# Patient Record
Sex: MALE | Race: WHITE | Employment: FULL TIME | URBAN - METROPOLITAN AREA
[De-identification: names, ages, dates, MRNs, and addresses within clinical notes are randomized per-mention and may not be internally consistent; named-entity substitution may affect disease eponyms.]

---

## 2021-11-04 ENCOUNTER — HOSPITAL ENCOUNTER (EMERGENCY)
Age: 26
Discharge: HOME | End: 2021-11-04
Attending: EMERGENCY MEDICINE
Payer: OTHER GOVERNMENT

## 2021-11-04 ENCOUNTER — APPOINTMENT (OUTPATIENT)
Dept: GENERAL RADIOLOGY | Age: 26
End: 2021-11-04
Attending: PHYSICIAN ASSISTANT
Payer: OTHER GOVERNMENT

## 2021-11-04 VITALS
HEART RATE: 56 BPM | SYSTOLIC BLOOD PRESSURE: 131 MMHG | BODY MASS INDEX: 32.28 KG/M2 | HEIGHT: 68 IN | TEMPERATURE: 97.9 F | RESPIRATION RATE: 15 BRPM | OXYGEN SATURATION: 100 % | DIASTOLIC BLOOD PRESSURE: 71 MMHG | WEIGHT: 213 LBS

## 2021-11-04 DIAGNOSIS — R07.9 CHEST PAIN, UNSPECIFIED TYPE: Primary | ICD-10-CM

## 2021-11-04 DIAGNOSIS — R55 NEAR SYNCOPE: ICD-10-CM

## 2021-11-04 DIAGNOSIS — Z82.49 FAMILY HISTORY OF HEART DISEASE: ICD-10-CM

## 2021-11-04 DIAGNOSIS — F41.9 ANXIETY: ICD-10-CM

## 2021-11-04 LAB
ALBUMIN SERPL-MCNC: 4.7 G/DL (ref 3.4–5)
ALBUMIN/GLOB SERPL: 1.5 {RATIO} (ref 0.8–1.7)
ALP SERPL-CCNC: 62 U/L (ref 45–117)
ALT SERPL-CCNC: 38 U/L (ref 16–61)
AMPHET UR QL SCN: NEGATIVE
ANION GAP SERPL CALC-SCNC: 10 MMOL/L (ref 3–18)
APPEARANCE UR: CLEAR
AST SERPL-CCNC: 19 U/L (ref 10–38)
BARBITURATES UR QL SCN: NEGATIVE
BASOPHILS # BLD: 0.1 K/UL (ref 0–0.1)
BASOPHILS NFR BLD: 0 % (ref 0–2)
BENZODIAZ UR QL: NEGATIVE
BILIRUB SERPL-MCNC: 0.8 MG/DL (ref 0.2–1)
BILIRUB UR QL: NEGATIVE
BUN SERPL-MCNC: 12 MG/DL (ref 7–18)
BUN/CREAT SERPL: 11 (ref 12–20)
CALCIUM SERPL-MCNC: 10.5 MG/DL (ref 8.5–10.1)
CANNABINOIDS UR QL SCN: NEGATIVE
CHLORIDE SERPL-SCNC: 103 MMOL/L (ref 100–111)
CK MB CFR SERPL CALC: NORMAL % (ref 0–4)
CK MB SERPL-MCNC: <1 NG/ML (ref 5–25)
CK SERPL-CCNC: 133 U/L (ref 39–308)
CO2 SERPL-SCNC: 25 MMOL/L (ref 21–32)
COCAINE UR QL SCN: NEGATIVE
COLOR UR: YELLOW
CREAT SERPL-MCNC: 1.06 MG/DL (ref 0.6–1.3)
D DIMER PPP FEU-MCNC: <0.27 UG/ML(FEU)
DIFFERENTIAL METHOD BLD: ABNORMAL
EOSINOPHIL # BLD: 0.2 K/UL (ref 0–0.4)
EOSINOPHIL NFR BLD: 1 % (ref 0–5)
ERYTHROCYTE [DISTWIDTH] IN BLOOD BY AUTOMATED COUNT: 13.7 % (ref 11.6–14.5)
GLOBULIN SER CALC-MCNC: 3.1 G/DL (ref 2–4)
GLUCOSE SERPL-MCNC: 100 MG/DL (ref 74–99)
GLUCOSE UR STRIP.AUTO-MCNC: NEGATIVE MG/DL
HCT VFR BLD AUTO: 45.8 % (ref 36–48)
HDSCOM,HDSCOM: NORMAL
HGB BLD-MCNC: 16 G/DL (ref 13–16)
HGB UR QL STRIP: NEGATIVE
KETONES UR QL STRIP.AUTO: ABNORMAL MG/DL
LEUKOCYTE ESTERASE UR QL STRIP.AUTO: NEGATIVE
LIPASE SERPL-CCNC: 126 U/L (ref 73–393)
LYMPHOCYTES # BLD: 4.4 K/UL (ref 0.9–3.6)
LYMPHOCYTES NFR BLD: 33 % (ref 21–52)
MAGNESIUM SERPL-MCNC: 1.8 MG/DL (ref 1.6–2.6)
MCH RBC QN AUTO: 30.7 PG (ref 24–34)
MCHC RBC AUTO-ENTMCNC: 34.9 G/DL (ref 31–37)
MCV RBC AUTO: 87.7 FL (ref 78–100)
METHADONE UR QL: NEGATIVE
MONOCYTES # BLD: 0.7 K/UL (ref 0.05–1.2)
MONOCYTES NFR BLD: 5 % (ref 3–10)
NEUTS SEG # BLD: 8.1 K/UL (ref 1.8–8)
NEUTS SEG NFR BLD: 59 % (ref 40–73)
NITRITE UR QL STRIP.AUTO: NEGATIVE
OPIATES UR QL: NEGATIVE
PCP UR QL: NEGATIVE
PH UR STRIP: 6.5 [PH] (ref 5–8)
PLATELET # BLD AUTO: 389 K/UL (ref 135–420)
PMV BLD AUTO: 10 FL (ref 9.2–11.8)
POTASSIUM SERPL-SCNC: 3.5 MMOL/L (ref 3.5–5.5)
PROT SERPL-MCNC: 7.8 G/DL (ref 6.4–8.2)
PROT UR STRIP-MCNC: NEGATIVE MG/DL
RBC # BLD AUTO: 5.22 M/UL (ref 4.35–5.65)
SODIUM SERPL-SCNC: 138 MMOL/L (ref 136–145)
SP GR UR REFRACTOMETRY: 1.02 (ref 1–1.03)
TROPONIN I SERPL-MCNC: <0.02 NG/ML (ref 0–0.04)
TROPONIN I SERPL-MCNC: <0.02 NG/ML (ref 0–0.04)
UROBILINOGEN UR QL STRIP.AUTO: 1 EU/DL (ref 0.2–1)
WBC # BLD AUTO: 13.6 K/UL (ref 4.6–13.2)

## 2021-11-04 PROCEDURE — 83735 ASSAY OF MAGNESIUM: CPT

## 2021-11-04 PROCEDURE — 93005 ELECTROCARDIOGRAM TRACING: CPT

## 2021-11-04 PROCEDURE — 80307 DRUG TEST PRSMV CHEM ANLYZR: CPT

## 2021-11-04 PROCEDURE — 85025 COMPLETE CBC W/AUTO DIFF WBC: CPT

## 2021-11-04 PROCEDURE — 71045 X-RAY EXAM CHEST 1 VIEW: CPT

## 2021-11-04 PROCEDURE — 85379 FIBRIN DEGRADATION QUANT: CPT

## 2021-11-04 PROCEDURE — 82553 CREATINE MB FRACTION: CPT

## 2021-11-04 PROCEDURE — 80053 COMPREHEN METABOLIC PANEL: CPT

## 2021-11-04 PROCEDURE — 83690 ASSAY OF LIPASE: CPT

## 2021-11-04 PROCEDURE — 99285 EMERGENCY DEPT VISIT HI MDM: CPT

## 2021-11-04 PROCEDURE — 81003 URINALYSIS AUTO W/O SCOPE: CPT

## 2021-11-04 NOTE — LETTER
[facility] [place]  THE [facility] EMERGENCY DEPT  [address] [facility] [place] [address]  [phone]    Work/School Note    Date: 11/4/2021    To Whom It May concern:    Chace Bill was seen and treated today in the emergency room by the following provider(s):  Attending Provider: Eve Hammer MD  Physician Assistant: KHALIDA Contreras. Chace Bill -patient was seen and evaluated in this emergency room tonight for atypical chest pain. Please allow patient to return to normal duties/activities.     Sincerely,          KHALIDA Young

## 2021-11-04 NOTE — ED TRIAGE NOTES
Anxious patient arrives with complaints of chest pain and anxiety that began 30min pta while shopping.

## 2021-11-04 NOTE — ED NOTES
Pt states he was in [address] when he started feeling a sharp 9/10 pain in his left chest.  States he was able to drive to the ED and pain come down to a 2/10.   Pt admits to having a family hx of MI - father  And pt admits to drinking 3 sugar free monsters a day and a 6 pack of beer a night due to recent stress and school

## 2021-11-04 NOTE — ED PROVIDER NOTES
EMERGENCY DEPARTMENT HISTORY AND PHYSICAL EXAM    Date: 11/4/2021  Patient Name: Alfredo Maldonado    History of Presenting Illness     Time Seen: 6:26 PM    Chief Complaint   Patient presents with    Chest Pain       History Provided By: Patient    Additional History (Context):   Alfredo Maldonado is a 27 y.o. male [employer] from [place] presents emergency room with complaints of left-sided chest pain. Patient was actually at [facility] when he started to feel some discomfort in his chest.  Almost felt like he was going to pass out. Pain was sharp. Nonradiating. Pain at that time was 9 out of 10. Currently 2 out of 10. Also had some shortness of breath. Complained of numbness to his fingertips. ? Hyperventilation. No prior history of cardiac or pulmonary disease. Strong family history of heart disease. Father passed away in his 45s from heart attack. Patient denies any hyperlipidemia, hypertension, diabetes. He is a smoker 1 pack/day. Patient also admits to drinking 3 sugar-free monster drinks a day. Has also been drinking a sixpack of beer a night for the last 2 weeks due to stress and school. No family history of blood clots. Denies history of anxiety/panic attacks. PCP: None        Past History     Past Medical History:  History reviewed. No pertinent past medical history. Past Surgical History:  History reviewed. No pertinent surgical history. Family History:  History reviewed. No pertinent family history. Social History:  Social History     Tobacco Use    Smoking status: Current Every Day Smoker     Packs/day: 1.00    Smokeless tobacco: Never Used   Substance Use Topics    Alcohol use: Yes     Comment: 6 beers/ daily    Drug use: Never       Allergies:  No Known Allergies      Review of Systems   Review of Systems   Constitutional: Negative for diaphoresis and fatigue. Respiratory: Positive for chest tightness and shortness of breath.     Cardiovascular: Positive for chest pain. Negative for palpitations. Gastrointestinal: Negative for abdominal pain, diarrhea, nausea and vomiting. Genitourinary: Negative for dysuria, flank pain and hematuria. Musculoskeletal: Negative for back pain. Neurological: Positive for numbness. Negative for dizziness, light-headedness and headaches. All other systems reviewed and are negative. Physical Exam     Vitals:    11/04/21 1813 11/04/21 1840 11/04/21 1844 11/04/21 1900   BP: (!) 156/91  127/85 131/71   Pulse: (!) 101  65 (!) 56   Resp: 22  16 15   Temp: 97.9 °F (36.6 °C)      SpO2: 100% 100% 95% 100%   Weight: 96.6 kg (213 lb)      Height: 5' 8\" (1.727 m)        Physical Exam  Vitals and nursing note reviewed. Constitutional:       General: He is not in acute distress. Appearance: Normal appearance. He is well-developed, well-groomed and normal weight. He is not ill-appearing or diaphoretic. Comments: Healthy-appearing 39-year-old male no apparent distress. Vital signs show him to be initially hypertensive 156/91. Heart rate 101. Cooperative. HENT:      Mouth/Throat:      Mouth: Mucous membranes are moist.      Pharynx: Oropharynx is clear. Eyes:      Extraocular Movements: Extraocular movements intact. Conjunctiva/sclera: Conjunctivae normal.      Pupils: Pupils are equal, round, and reactive to light. Neck:      Thyroid: No thyromegaly. Vascular: No JVD. Trachea: Trachea normal. No tracheal deviation. Cardiovascular:      Rate and Rhythm: Normal rate and regular rhythm. Pulses: Normal pulses. Heart sounds: Normal heart sounds. Pulmonary:      Effort: Pulmonary effort is normal.      Breath sounds: Normal breath sounds and air entry. Chest:      Chest wall: Tenderness present. Comments: Mild tenderness to palpation along the upper sternum. Remainder of the anterior chest wall, anterior rib cage and posterior thoracic cage nontender.   Abdominal:      General: Abdomen is flat. Bowel sounds are normal.      Palpations: Abdomen is soft. There is no hepatomegaly, splenomegaly, mass or pulsatile mass. Tenderness: There is no abdominal tenderness. There is no guarding or rebound. Negative signs include Hanna's sign and McBurney's sign. Musculoskeletal:         General: No swelling or tenderness. Normal range of motion. Cervical back: Neck supple. Right lower leg: No edema. Left lower leg: No edema. Skin:     General: Skin is warm and dry. Neurological:      General: No focal deficit present. Mental Status: He is alert and oriented to person, place, and time. Psychiatric:         Mood and Affect: Mood normal.         Behavior: Behavior normal. Behavior is cooperative. Nursing note and vitals reviewed         Diagnostic Study Results     Labs -     Recent Results (from the past 12 hour(s))   EKG, 12 LEAD, INITIAL    Collection Time: 11/04/21  6:15 PM   Result Value Ref Range    Ventricular Rate 93 BPM    Atrial Rate 93 BPM    P-R Interval 148 ms    QRS Duration 92 ms    Q-T Interval 362 ms    QTC Calculation (Bezet) 450 ms    Calculated P Axis 68 degrees    Calculated R Axis 35 degrees    Calculated T Axis 53 degrees    Diagnosis       Normal sinus rhythm  Normal ECG  No previous ECGs available     CBC WITH AUTOMATED DIFF    Collection Time: 11/04/21  6:21 PM   Result Value Ref Range    WBC 13.6 (H) 4.6 - 13.2 K/uL    RBC 5.22 4.35 - 5.65 M/uL    HGB 16.0 13.0 - 16.0 g/dL    HCT 45.8 36.0 - 48.0 %    MCV 87.7 78.0 - 100.0 FL    MCH 30.7 24.0 - 34.0 PG    MCHC 34.9 31.0 - 37.0 g/dL    RDW 13.7 11.6 - 14.5 %    PLATELET 451 617 - 371 K/uL    MPV 10.0 9.2 - 11.8 FL    NEUTROPHILS 59 40 - 73 %    LYMPHOCYTES 33 21 - 52 %    MONOCYTES 5 3 - 10 %    EOSINOPHILS 1 0 - 5 %    BASOPHILS 0 0 - 2 %    ABS. NEUTROPHILS 8.1 (H) 1.8 - 8.0 K/UL    ABS. LYMPHOCYTES 4.4 (H) 0.9 - 3.6 K/UL    ABS. MONOCYTES 0.7 0.05 - 1.2 K/UL    ABS.  EOSINOPHILS 0.2 0.0 - 0.4 K/UL ABS. BASOPHILS 0.1 0.0 - 0.1 K/UL    DF AUTOMATED     D DIMER    Collection Time: 11/04/21  6:21 PM   Result Value Ref Range    D DIMER <0.27 <0.46 ug/ml(FEU)   METABOLIC PANEL, COMPREHENSIVE    Collection Time: 11/04/21  6:21 PM   Result Value Ref Range    Sodium 138 136 - 145 mmol/L    Potassium 3.5 3.5 - 5.5 mmol/L    Chloride 103 100 - 111 mmol/L    CO2 25 21 - 32 mmol/L    Anion gap 10 3.0 - 18 mmol/L    Glucose 100 (H) 74 - 99 mg/dL    BUN 12 7.0 - 18 MG/DL    Creatinine 1.06 0.6 - 1.3 MG/DL    BUN/Creatinine ratio 11 (L) 12 - 20      GFR est AA >60 >60 ml/min/1.73m2    GFR est non-AA >60 >60 ml/min/1.73m2    Calcium 10.5 (H) 8.5 - 10.1 MG/DL    Bilirubin, total 0.8 0.2 - 1.0 MG/DL    ALT (SGPT) 38 16 - 61 U/L    AST (SGOT) 19 10 - 38 U/L    Alk.  phosphatase 62 45 - 117 U/L    Protein, total 7.8 6.4 - 8.2 g/dL    Albumin 4.7 3.4 - 5.0 g/dL    Globulin 3.1 2.0 - 4.0 g/dL    A-G Ratio 1.5 0.8 - 1.7     LIPASE    Collection Time: 11/04/21  6:21 PM   Result Value Ref Range    Lipase 126 73 - 393 U/L   CARDIAC PANEL,(CK, CKMB & TROPONIN)    Collection Time: 11/04/21  6:21 PM   Result Value Ref Range    CK - MB <1.0 <3.6 ng/ml    CK-MB Index  0.0 - 4.0 %     CALCULATION NOT PERFORMED WHEN RESULT IS BELOW LINEAR LIMIT     39 - 308 U/L    Troponin-I, QT <0.02 0.0 - 0.045 NG/ML   MAGNESIUM    Collection Time: 11/04/21  6:21 PM   Result Value Ref Range    Magnesium 1.8 1.6 - 2.6 mg/dL   URINALYSIS W/ RFLX MICROSCOPIC    Collection Time: 11/04/21  7:06 PM   Result Value Ref Range    Color YELLOW      Appearance CLEAR      Specific gravity 1.017 1.005 - 1.030      pH (UA) 6.5 5.0 - 8.0      Protein Negative NEG mg/dL    Glucose Negative NEG mg/dL    Ketone TRACE (A) NEG mg/dL    Bilirubin Negative NEG      Blood Negative NEG      Urobilinogen 1.0 0.2 - 1.0 EU/dL    Nitrites Negative NEG      Leukocyte Esterase Negative NEG     DRUG SCREEN, URINE    Collection Time: 11/04/21  7:06 PM   Result Value Ref Range BENZODIAZEPINES Negative NEG      BARBITURATES Negative NEG      THC (TH-CANNABINOL) Negative NEG      OPIATES Negative NEG      PCP(PHENCYCLIDINE) Negative NEG      COCAINE Negative NEG      AMPHETAMINES Negative NEG      METHADONE Negative NEG      HDSCOM (NOTE)    TROPONIN I    Collection Time: 11/04/21  8:40 PM   Result Value Ref Range    Troponin-I, QT <0.02 0.0 - 0.045 NG/ML       Radiologic Studies  XR CHEST PORT   Final Result       1. No acute pulmonary disease. CT Results  (Last 48 hours)    None        CXR Results  (Last 48 hours)               11/04/21 1848  XR CHEST PORT Final result    Impression:      1. No acute pulmonary disease. Narrative:  EXAM: Portable chest radiograph 11/4/2021       CLINICAL INDICATION/HISTORY: Left-sided chest pain. TECHNIQUE: A semierect portable radiograph was obtained. COMPARISON: None. FINDINGS: The cardiomediastinal contours are within normal limits. The lungs   are clear. There is no pneumothorax or pleural effusion. Medical Decision Making   I am the first provider for this patient. I reviewed the vital signs, available nursing notes, past medical history, past surgical history, family history and social history. Vital Signs-Reviewed the patient's vital signs. Pulse Oximetry Analysis 100% on room air    Records Reviewed: Nursing Notes    DDX:  Chest pain, shortness of breath, presyncope  Consider ACS. Strong family history of cardiac with father passing away from MI  Consider pulmonary -? PE  Doubt vascular  Patient drinks monster drinks -has not had any today. ?  Related  Consider electrolyte balance  Consider tachyarrhythmia related to energy drink  Anxiety/panic. Hyperventilation    Provider Notes:   27 y.o. male   HEART SCORE 1  [address] - couldn't r/o        Procedures:  Procedures    ED Course:   Initial assessment performed.  The patients presenting problems have been discussed, and they are in agreement with the care plan formulated and outlined with them. I have encouraged them to ask questions as they arise throughout their visit. ED Physician Discussion Note:   Mild white blood cell count elevation otherwise normal CBC  Urinalysis negative  Chemistries negative  Cardiacs negative  D-dimer negative  Chest x-ray negative EKG negative      Did not pursue CTA due to negative D-dimer    Repeating troponin due to family history of heart disease. If negative, plan is to discharge home    Second troponin negative. Patient was advised. Advised patient at this point do not have a true explanation as to what happened to them. 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 Discharge home. Diagnosis and Disposition       DISCHARGE NOTE:  9:23 PM    Shaun Reaves's  results have been reviewed with him. He has been counseled regarding his diagnosis, treatment, and plan. He verbally conveys understanding and agreement of the signs, symptoms, diagnosis, treatment and prognosis and additionally agrees to follow up as discussed. He also agrees with the care-plan and conveys that all of his questions have been answered. I have also provided discharge instructions for him that include: educational information regarding their diagnosis and treatment, and list of reasons why they would want to return to the ED prior to their follow-up appointment, should his condition change. He has been provided with education for proper emergency department utilization. CLINICAL IMPRESSION:    1. Chest pain, unspecified type    2. Near syncope    3. Family history of heart disease    4. Anxiety        PLAN:  1. D/C Home  2. There are no discharge medications for this patient.     3.   Follow-up Information     Follow up With Specialties Details Why Contact Info    PCP through [facility] upon return back to [place] to discuss ER visit and for close follow-up     [facility] EMERGENCY DEPT Emergency Medicine  If symptoms worsen, As needed [address] [id number]  [phone]        ____________________________________     Please note that this dictation was completed with [employer], the computer voice recognition software. Quite often unanticipated grammatical, syntax, homophones, and other interpretive errors are inadvertently transcribed by the computer software. Please disregard these errors. Please excuse any errors that have escaped final proofreading.

## 2021-11-05 LAB
ATRIAL RATE: 93 BPM
CALCULATED P AXIS, ECG09: 68 DEGREES
CALCULATED R AXIS, ECG10: 35 DEGREES
CALCULATED T AXIS, ECG11: 53 DEGREES
DIAGNOSIS, 93000: NORMAL
P-R INTERVAL, ECG05: 148 MS
Q-T INTERVAL, ECG07: 362 MS
QRS DURATION, ECG06: 92 MS
QTC CALCULATION (BEZET), ECG08: 450 MS
VENTRICULAR RATE, ECG03: 93 BPM

## 2021-11-05 NOTE — DISCHARGE INSTRUCTIONS
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   Return to ER  Avoid excessive use of energy drinks